# Patient Record
Sex: MALE | Race: WHITE | NOT HISPANIC OR LATINO
[De-identification: names, ages, dates, MRNs, and addresses within clinical notes are randomized per-mention and may not be internally consistent; named-entity substitution may affect disease eponyms.]

---

## 2024-10-30 ENCOUNTER — DASHBOARD ENCOUNTERS (OUTPATIENT)
Age: 64
End: 2024-10-30

## 2024-11-07 ENCOUNTER — OFFICE VISIT (OUTPATIENT)
Dept: RURAL CLINIC 4 | Facility: CLINIC | Age: 64
End: 2024-11-07

## 2024-11-07 ENCOUNTER — LAB OUTSIDE AN ENCOUNTER (OUTPATIENT)
Dept: RURAL CLINIC 4 | Facility: CLINIC | Age: 64
End: 2024-11-07

## 2024-11-07 PROBLEM — 59621000: Status: ACTIVE | Noted: 2024-11-07

## 2024-11-07 PROBLEM — 235595009: Status: ACTIVE | Noted: 2024-11-07

## 2024-11-07 PROBLEM — 267434003: Status: ACTIVE | Noted: 2024-11-07

## 2024-11-07 PROBLEM — 271737000: Status: ACTIVE | Noted: 2024-11-07

## 2024-11-07 PROBLEM — 700379002: Status: ACTIVE | Noted: 2024-11-07

## 2024-11-07 RX ORDER — CYCLOBENZAPRINE HYDROCHLORIDE 10 MG/1
TABLET, FILM COATED ORAL
Qty: 0 | Refills: 0 | Status: ACTIVE | COMMUNITY
Start: 1900-01-01

## 2024-11-07 RX ORDER — ALLOPURINOL 100 MG/1
TABLET ORAL
Qty: 0 | Refills: 0 | Status: ACTIVE | COMMUNITY
Start: 1900-01-01

## 2024-11-07 RX ORDER — DEXTROSE 4 G
TAKE 1 TABLET (10 MG) BY ORAL ROUTE ONCE DAILY TABLET,CHEWABLE ORAL 1
Qty: 0 | Refills: 0 | Status: ACTIVE | COMMUNITY
Start: 1900-01-01

## 2024-11-07 RX ORDER — POLYETHYLENE GLYCOL 3350, SODIUM SULFATE ANHYDROUS, SODIUM BICARBONATE, SODIUM CHLORIDE, POTASSIUM CHLORIDE 236; 22.74; 6.74; 5.86; 2.97 G/4L; G/4L; G/4L; G/4L; G/4L
4000 ML POWDER, FOR SOLUTION ORAL 1
Qty: 1 | Refills: 0 | OUTPATIENT
Start: 2024-11-07 | End: 2024-11-08

## 2024-11-07 RX ORDER — CARVEDILOL 12.5 MG/1
TABLET, FILM COATED ORAL
Qty: 0 | Refills: 0 | Status: ACTIVE | COMMUNITY
Start: 1900-01-01

## 2024-11-07 RX ORDER — ATORVASTATIN CALCIUM 20 MG/1
TABLET, FILM COATED ORAL
Qty: 0 | Refills: 0 | Status: ACTIVE | COMMUNITY
Start: 1900-01-01

## 2024-11-07 RX ORDER — OMEGA-3/DHA/EPA/FISH OIL 1000 MG
CAPSULE ORAL
Qty: 0 | Refills: 0 | Status: ACTIVE | COMMUNITY
Start: 1900-01-01

## 2024-11-07 RX ORDER — LOSARTAN POTASSIUM AND HYDROCHLOROTHIAZIDE 100; 25 MG/1; MG/1
TAKE 1 TABLET BY ORAL ROUTE ONCE DAILY TABLET, FILM COATED ORAL 1
Qty: 0 | Refills: 0 | Status: ACTIVE | COMMUNITY
Start: 1900-01-01

## 2024-11-21 ENCOUNTER — DASHBOARD ENCOUNTERS (OUTPATIENT)
Age: 64
End: 2024-11-21

## 2024-11-21 ENCOUNTER — LAB OUTSIDE AN ENCOUNTER (OUTPATIENT)
Dept: RURAL CLINIC 4 | Facility: CLINIC | Age: 64
End: 2024-11-21

## 2024-11-21 ENCOUNTER — OFFICE VISIT (OUTPATIENT)
Dept: RURAL CLINIC 4 | Facility: CLINIC | Age: 64
End: 2024-11-21
Payer: COMMERCIAL

## 2024-11-21 VITALS
SYSTOLIC BLOOD PRESSURE: 147 MMHG | HEIGHT: 71 IN | TEMPERATURE: 98.2 F | DIASTOLIC BLOOD PRESSURE: 92 MMHG | HEART RATE: 72 BPM | BODY MASS INDEX: 27.64 KG/M2 | WEIGHT: 197.4 LBS

## 2024-11-21 DIAGNOSIS — E11.51 TYPE 2 DIABETES MELLITUS WITH DIABETIC PERIPHERAL ANGIOPATHY WITHOUT GANGRENE, WITHOUT LONG-TERM CURRENT USE OF INSULIN: ICD-10-CM

## 2024-11-21 DIAGNOSIS — I73.9 PVD (PERIPHERAL VASCULAR DISEASE): ICD-10-CM

## 2024-11-21 DIAGNOSIS — Z79.02 ENCOUNTER FOR CURRENT LONG TERM USE OF ANTIPLATELET DRUG: ICD-10-CM

## 2024-11-21 DIAGNOSIS — D50.0 IRON DEFICIENCY ANEMIA DUE TO CHRONIC BLOOD LOSS: ICD-10-CM

## 2024-11-21 DIAGNOSIS — Z79.01 CHRONIC ANTICOAGULATION: ICD-10-CM

## 2024-11-21 DIAGNOSIS — N18.32 STAGE 3B CHRONIC KIDNEY DISEASE (CKD): ICD-10-CM

## 2024-11-21 DIAGNOSIS — Z86.0100 PERSONAL HISTORY OF COLON POLYPS, UNSPECIFIED: ICD-10-CM

## 2024-11-21 PROBLEM — 449681000124101: Status: ACTIVE | Noted: 2024-11-21

## 2024-11-21 PROBLEM — 724556004: Status: ACTIVE | Noted: 2024-11-21

## 2024-11-21 PROBLEM — 400047006: Status: ACTIVE | Noted: 2024-11-21

## 2024-11-21 PROBLEM — 711150003: Status: ACTIVE | Noted: 2024-11-21

## 2024-11-21 PROBLEM — 428283002: Status: ACTIVE | Noted: 2024-11-21

## 2024-11-21 PROBLEM — 700379002: Status: ACTIVE | Noted: 2024-11-21

## 2024-11-21 PROBLEM — 314902007: Status: ACTIVE | Noted: 2024-11-21

## 2024-11-21 PROCEDURE — 99204 OFFICE O/P NEW MOD 45 MIN: CPT | Performed by: NURSE PRACTITIONER

## 2024-11-21 RX ORDER — RIVAROXABAN 20 MG/1
TAKE 1 TABLET BY MOUTH ONCE DAILY TABLET, FILM COATED ORAL
Qty: 30 EACH | Refills: 2 | Status: ACTIVE | COMMUNITY

## 2024-11-21 RX ORDER — CARVEDILOL 12.5 MG/1
TAKE 1 TABLET BY MOUTH TWICE DAILY TAKE INSTEAD OF METOPROLOL TABLET, FILM COATED ORAL
Qty: 60 EACH | Refills: 2 | Status: ACTIVE | COMMUNITY

## 2024-11-21 RX ORDER — ALLOPURINOL 100 MG/1
TAKE 1 TABLET BY MOUTH ONCE DAILY TABLET ORAL
Qty: 30 EACH | Refills: 2 | Status: ACTIVE | COMMUNITY

## 2024-11-21 RX ORDER — CLOPIDOGREL BISULFATE 75 MG/1
1 TABLET TABLET ORAL ONCE A DAY
Status: ACTIVE | COMMUNITY

## 2024-11-21 RX ORDER — ATORVASTATIN CALCIUM 80 MG/1
TAKE 1 TABLET BY MOUTH ONCE DAILY TABLET, FILM COATED ORAL
Qty: 30 EACH | Refills: 1 | Status: ACTIVE | COMMUNITY

## 2024-11-21 RX ORDER — LORATADINE 10 MG/1
TAKE 1 TABLET BY MOUTH ONCE DAILY TABLET ORAL
Qty: 30 EACH | Refills: 2 | Status: ACTIVE | COMMUNITY

## 2024-11-21 RX ORDER — LEVOFLOXACIN 500 MG/1
TAKE 1 TABLET BY MOUTH ONCE DAILY TABLET, FILM COATED ORAL
Qty: 10 EACH | Refills: 0 | Status: ACTIVE | COMMUNITY

## 2024-11-21 RX ORDER — VALSARTAN 80 MG/1
TAKE 1 TABLET BY MOUTH ONCE DAILY --TAKE INSTEAD OF VALSARTAN/HCTZ TABLET, FILM COATED ORAL
Qty: 30 EACH | Refills: 0 | Status: ACTIVE | COMMUNITY

## 2024-11-21 RX ORDER — CYCLOBENZAPRINE HYDROCHLORIDE 10 MG/1
TAKE 1 TABLET BY MOUTH TWICE DAILY AS NEEDED FOR BACK PAIN TABLET, FILM COATED ORAL
Qty: 60 EACH | Refills: 2 | Status: ACTIVE | COMMUNITY

## 2024-11-21 RX ORDER — METFORMIN HYDROCHLORIDE 500 MG/1
1 TABLET WITH A MEAL TABLET, FILM COATED ORAL ONCE A DAY
Status: ACTIVE | COMMUNITY

## 2024-11-21 RX ORDER — POLYETHYLENE GLYCOL 3350, SODIUM SULFATE ANHYDROUS, SODIUM BICARBONATE, SODIUM CHLORIDE, POTASSIUM CHLORIDE 236; 22.74; 6.74; 5.86; 2.97 G/4L; G/4L; G/4L; G/4L; G/4L
4000 ML POWDER, FOR SOLUTION ORAL 1
Qty: 1 | Refills: 0 | OUTPATIENT
Start: 2024-11-21 | End: 2024-11-22

## 2024-11-21 NOTE — HPI-TODAY'S VISIT:
The pt is a 63 y/o gentleman who presents on referral from Dr. Amparo Mcqueen for [facility].  A copy of this document to be sent to the referring provider. The pt who is a poor historian reportedly has no idea why he is here. He had recent  blood transfusions for a Hgb of 6.7. Stool of OB negative. He offers no c/o fatigue, abdominal pain or visible GI bleeding. According to his medical record, he has a h/o colon polyps and was last surveillance in 2021. He is currently on 2 blood thinners, Xarelto and Plavix. He said he has stents in his legs and has an appointment tomorrow with Dr. Dumont. Other medical h/o uncontrolled diabetes s/p toe amputation secondary to PAD and KCD stage 3.